# Patient Record
Sex: MALE | Race: WHITE | URBAN - METROPOLITAN AREA
[De-identification: names, ages, dates, MRNs, and addresses within clinical notes are randomized per-mention and may not be internally consistent; named-entity substitution may affect disease eponyms.]

---

## 2022-02-09 ENCOUNTER — OFFICE VISIT (OUTPATIENT)
Dept: URBAN - METROPOLITAN AREA SURGERY CENTER 13 | Facility: SURGERY CENTER | Age: 52
End: 2022-02-09
Payer: COMMERCIAL

## 2022-02-09 DIAGNOSIS — Z12.11 AVERAGE RISK FOR CRC. DUE TO PT'S CO-MORBID STATE WITH END STAGE DEMENTIA, HIGH RISK FOR ANESTHESIA (PER NEUROLOGY); INABILITY TO TAKE A BOWEL PREP....WOULD NOT ADVISE ANY COLORECTAL CANCER SCREENING INCLUDING STOOL TEST FOR FECAL BLOOD.: ICD-10-CM

## 2022-02-09 PROCEDURE — 45378 DIAGNOSTIC COLONOSCOPY: CPT | Performed by: INTERNAL MEDICINE

## 2022-02-09 PROCEDURE — G8907 PT DOC NO EVENTS ON DISCHARG: HCPCS | Performed by: INTERNAL MEDICINE

## 2022-02-18 ENCOUNTER — WEB ENCOUNTER (OUTPATIENT)
Dept: URBAN - METROPOLITAN AREA CLINIC 23 | Facility: CLINIC | Age: 52
End: 2022-02-18

## 2022-02-18 ENCOUNTER — LAB OUTSIDE AN ENCOUNTER (OUTPATIENT)
Dept: URBAN - METROPOLITAN AREA CLINIC 23 | Facility: CLINIC | Age: 52
End: 2022-02-18

## 2022-02-18 ENCOUNTER — OFFICE VISIT (OUTPATIENT)
Dept: URBAN - METROPOLITAN AREA CLINIC 23 | Facility: CLINIC | Age: 52
End: 2022-02-18
Payer: COMMERCIAL

## 2022-02-18 DIAGNOSIS — K75.81 NASH (NONALCOHOLIC STEATOHEPATITIS): ICD-10-CM

## 2022-02-18 DIAGNOSIS — Z83.79 FAMILY HISTORY OF CIRRHOSIS OF LIVER: ICD-10-CM

## 2022-02-18 DIAGNOSIS — R74.8 ELEVATED LIVER ENZYMES: ICD-10-CM

## 2022-02-18 DIAGNOSIS — Z80.0 FAMILY HISTORY OF LIVER CANCER: ICD-10-CM

## 2022-02-18 PROCEDURE — G9622 NO UNHEAL ETOH USER: HCPCS | Performed by: INTERNAL MEDICINE

## 2022-02-18 PROCEDURE — 99204 OFFICE O/P NEW MOD 45 MIN: CPT | Performed by: INTERNAL MEDICINE

## 2022-02-18 PROCEDURE — 1036F TOBACCO NON-USER: CPT | Performed by: INTERNAL MEDICINE

## 2022-02-18 PROCEDURE — G8420 CALC BMI NORM PARAMETERS: HCPCS | Performed by: INTERNAL MEDICINE

## 2022-02-18 PROCEDURE — G8427 DOCREV CUR MEDS BY ELIG CLIN: HCPCS | Performed by: INTERNAL MEDICINE

## 2022-02-18 PROCEDURE — 3017F COLORECTAL CA SCREEN DOC REV: CPT | Performed by: INTERNAL MEDICINE

## 2022-02-18 NOTE — HPI-TODAY'S VISIT:
51-year-old male presents with elevated liver enzymes.  He has had elevated liver enzymes on and off for years.  He was diagnosed with ARREOLA at age 31.  Ultrasound done 20 years ago.  Sounds like no GI work-up previously.  His heaviest weight 250 pounds.  Denies excessive alcohol use. Completely off alcohol since November.  Significant family history of liver disease and cancer.   Father had hep C and liver cancer.  His paternal uncle had liver transplantation.  A sister has ARREOLA.

## 2022-02-18 NOTE — PREVIOUS WORKUP REVIEWED
.ENDOSCOPIES-Colonoscopy 2/9/2022: Normal TI.  Normal colon.  Internal hemorrhoids.  Repeat colonoscopy in 10 years.LABS-Labs 8/14/2021: WBC 6.8, hemoglobin 13.8, platelet 157, BUN 24, creatinine 1.0, sodium 138, potassium 4.1.IMAGES

## 2022-02-20 ENCOUNTER — DASHBOARD ENCOUNTERS (OUTPATIENT)
Age: 52
End: 2022-02-20

## 2022-02-21 PROBLEM — 442685003: Status: ACTIVE | Noted: 2022-02-18

## 2022-03-09 LAB
A/G RATIO: 1.7
ACTIN (SMOOTH MUSCLE) ANTIBODY: 7
ALBUMIN: 4.7
ALKALINE PHOSPHATASE: 96
ALPHA-1-ANTITRYPSIN, SERUM: 131
ALT (SGPT): 84
ANA DIRECT: NEGATIVE
ANTIMYELOPEROXIDASE (MPO) ABS: <9
ANTIPROTEINASE 3 (PR-3) ABS: <3.5
AST (SGOT): 46
ATYPICAL PANCA: (no result)
BILIRUBIN, TOTAL: 0.4
BUN/CREATININE RATIO: 17
BUN: 20
CALCIUM: 9.7
CARBON DIOXIDE, TOTAL: 23
CERULOPLASMIN: 18.3
CHLORIDE: 105
CREATININE: 1.16
CYTOPLASMIC (C-ANCA): (no result)
EGFR IF AFRICN AM: 84
EGFR IF NONAFRICN AM: 73
GLOBULIN, TOTAL: 2.7
GLUCOSE: 88
HBSAG SCREEN: NEGATIVE
HEP B CORE AB, TOT: NEGATIVE
HEP B SURFACE AB, QUAL: NON REACTIVE
HEP C VIRUS AB: <0.1
IGG, IMMUNOGLOBULIN G (RDL): 1050
IRON BIND.CAP.(TIBC): 250
IRON SATURATION: 41
IRON: 103
MITOCHONDRIAL (M2) ANTIBODY: <20
PERINUCLEAR (P-ANCA): (no result)
PHENOTYPE (PI): (no result)
POTASSIUM: 4.5
PROTEIN, TOTAL: 7.4
SODIUM: 143
UIBC: 147